# Patient Record
Sex: FEMALE | Race: WHITE | NOT HISPANIC OR LATINO
[De-identification: names, ages, dates, MRNs, and addresses within clinical notes are randomized per-mention and may not be internally consistent; named-entity substitution may affect disease eponyms.]

---

## 2017-01-06 ENCOUNTER — APPOINTMENT (OUTPATIENT)
Dept: HUMAN REPRODUCTION | Facility: CLINIC | Age: 32
End: 2017-01-06

## 2017-01-08 ENCOUNTER — APPOINTMENT (OUTPATIENT)
Dept: HUMAN REPRODUCTION | Facility: CLINIC | Age: 32
End: 2017-01-08

## 2017-01-09 ENCOUNTER — APPOINTMENT (OUTPATIENT)
Dept: HUMAN REPRODUCTION | Facility: CLINIC | Age: 32
End: 2017-01-09

## 2017-01-10 ENCOUNTER — APPOINTMENT (OUTPATIENT)
Dept: HUMAN REPRODUCTION | Facility: CLINIC | Age: 32
End: 2017-01-10

## 2017-02-02 ENCOUNTER — APPOINTMENT (OUTPATIENT)
Dept: HUMAN REPRODUCTION | Facility: CLINIC | Age: 32
End: 2017-02-02

## 2017-02-02 DIAGNOSIS — N97.9 FEMALE INFERTILITY, UNSPECIFIED: ICD-10-CM

## 2017-02-02 RX ORDER — CHORIOGONADOTROPIN ALFA 250 UG/.5ML
250 INJECTION, SOLUTION SUBCUTANEOUS
Qty: 1 | Refills: 2 | Status: ACTIVE | COMMUNITY
Start: 2017-02-02 | End: 1900-01-01

## 2017-02-04 ENCOUNTER — APPOINTMENT (OUTPATIENT)
Dept: HUMAN REPRODUCTION | Facility: CLINIC | Age: 32
End: 2017-02-04

## 2017-02-05 ENCOUNTER — APPOINTMENT (OUTPATIENT)
Dept: HUMAN REPRODUCTION | Facility: CLINIC | Age: 32
End: 2017-02-05

## 2017-03-01 ENCOUNTER — APPOINTMENT (OUTPATIENT)
Dept: HUMAN REPRODUCTION | Facility: CLINIC | Age: 32
End: 2017-03-01

## 2017-03-01 RX ORDER — CHORIOGONADOTROPIN ALFA 250 UG/.5ML
250 INJECTION, SOLUTION SUBCUTANEOUS
Qty: 0.5 | Refills: 0 | Status: ACTIVE | COMMUNITY
Start: 2017-03-01 | End: 1900-01-01

## 2017-03-03 ENCOUNTER — APPOINTMENT (OUTPATIENT)
Dept: HUMAN REPRODUCTION | Facility: CLINIC | Age: 32
End: 2017-03-03

## 2017-03-17 ENCOUNTER — APPOINTMENT (OUTPATIENT)
Dept: HUMAN REPRODUCTION | Facility: CLINIC | Age: 32
End: 2017-03-17

## 2017-03-17 RX ORDER — CHORIOGONADOTROPIN ALFA 250 UG/.5ML
250 INJECTION, SOLUTION SUBCUTANEOUS
Qty: 1 | Refills: 2 | Status: ACTIVE | COMMUNITY
Start: 2017-03-17 | End: 1900-01-01

## 2017-03-17 RX ORDER — CLOMIPHENE CITRATE 50 MG/1
50 TABLET ORAL DAILY
Qty: 5 | Refills: 0 | Status: ACTIVE | COMMUNITY
Start: 2017-03-17 | End: 1900-01-01

## 2017-03-24 ENCOUNTER — APPOINTMENT (OUTPATIENT)
Dept: HUMAN REPRODUCTION | Facility: CLINIC | Age: 32
End: 2017-03-24

## 2017-03-27 ENCOUNTER — APPOINTMENT (OUTPATIENT)
Dept: HUMAN REPRODUCTION | Facility: CLINIC | Age: 32
End: 2017-03-27

## 2017-03-31 ENCOUNTER — APPOINTMENT (OUTPATIENT)
Dept: HUMAN REPRODUCTION | Facility: CLINIC | Age: 32
End: 2017-03-31

## 2017-04-01 ENCOUNTER — APPOINTMENT (OUTPATIENT)
Dept: HUMAN REPRODUCTION | Facility: CLINIC | Age: 32
End: 2017-04-01

## 2017-04-14 ENCOUNTER — APPOINTMENT (OUTPATIENT)
Dept: HUMAN REPRODUCTION | Facility: CLINIC | Age: 32
End: 2017-04-14

## 2017-04-14 RX ORDER — CHORIOGONADOTROPIN ALFA 250 UG/.5ML
250 INJECTION, SOLUTION SUBCUTANEOUS
Qty: 1 | Refills: 0 | Status: ACTIVE | COMMUNITY
Start: 2017-04-14 | End: 1900-01-01

## 2017-04-14 RX ORDER — CLOMIPHENE CITRATE 50 MG/1
50 TABLET ORAL DAILY
Qty: 5 | Refills: 0 | Status: ACTIVE | COMMUNITY
Start: 2017-04-14 | End: 1900-01-01

## 2017-04-24 ENCOUNTER — APPOINTMENT (OUTPATIENT)
Dept: HUMAN REPRODUCTION | Facility: CLINIC | Age: 32
End: 2017-04-24

## 2017-04-27 ENCOUNTER — APPOINTMENT (OUTPATIENT)
Dept: HUMAN REPRODUCTION | Facility: CLINIC | Age: 32
End: 2017-04-27

## 2017-05-12 ENCOUNTER — APPOINTMENT (OUTPATIENT)
Dept: HUMAN REPRODUCTION | Facility: CLINIC | Age: 32
End: 2017-05-12

## 2017-05-23 ENCOUNTER — APPOINTMENT (OUTPATIENT)
Dept: HUMAN REPRODUCTION | Facility: CLINIC | Age: 32
End: 2017-05-23

## 2017-05-31 ENCOUNTER — APPOINTMENT (OUTPATIENT)
Dept: HUMAN REPRODUCTION | Facility: CLINIC | Age: 32
End: 2017-05-31

## 2017-06-12 ENCOUNTER — APPOINTMENT (OUTPATIENT)
Dept: HUMAN REPRODUCTION | Facility: CLINIC | Age: 32
End: 2017-06-12

## 2017-06-26 ENCOUNTER — APPOINTMENT (OUTPATIENT)
Dept: HUMAN REPRODUCTION | Facility: CLINIC | Age: 32
End: 2017-06-26

## 2017-08-23 ENCOUNTER — ASOB RESULT (OUTPATIENT)
Age: 32
End: 2017-08-23

## 2017-08-23 ENCOUNTER — APPOINTMENT (OUTPATIENT)
Dept: ANTEPARTUM | Facility: CLINIC | Age: 32
End: 2017-08-23
Payer: COMMERCIAL

## 2017-08-23 PROCEDURE — 76811 OB US DETAILED SNGL FETUS: CPT

## 2017-09-01 ENCOUNTER — ASOB RESULT (OUTPATIENT)
Age: 32
End: 2017-09-01

## 2017-09-01 ENCOUNTER — APPOINTMENT (OUTPATIENT)
Dept: ANTEPARTUM | Facility: CLINIC | Age: 32
End: 2017-09-01
Payer: COMMERCIAL

## 2017-09-01 PROCEDURE — 76816 OB US FOLLOW-UP PER FETUS: CPT

## 2017-09-25 ENCOUNTER — ASOB RESULT (OUTPATIENT)
Age: 32
End: 2017-09-25

## 2017-09-25 ENCOUNTER — APPOINTMENT (OUTPATIENT)
Dept: ANTEPARTUM | Facility: CLINIC | Age: 32
End: 2017-09-25
Payer: COMMERCIAL

## 2017-09-25 PROCEDURE — 76820 UMBILICAL ARTERY ECHO: CPT

## 2017-09-25 PROCEDURE — 76816 OB US FOLLOW-UP PER FETUS: CPT

## 2018-01-16 ENCOUNTER — INPATIENT (INPATIENT)
Facility: HOSPITAL | Age: 33
LOS: 1 days | Discharge: HOME | End: 2018-01-18
Attending: OBSTETRICS & GYNECOLOGY | Admitting: OBSTETRICS & GYNECOLOGY
Payer: COMMERCIAL

## 2018-01-16 VITALS
RESPIRATION RATE: 18 BRPM | HEART RATE: 86 BPM | TEMPERATURE: 98 F | SYSTOLIC BLOOD PRESSURE: 105 MMHG | OXYGEN SATURATION: 100 % | DIASTOLIC BLOOD PRESSURE: 65 MMHG

## 2018-01-16 DIAGNOSIS — Z3A.00 WEEKS OF GESTATION OF PREGNANCY NOT SPECIFIED: ICD-10-CM

## 2018-01-16 DIAGNOSIS — O26.899 OTHER SPECIFIED PREGNANCY RELATED CONDITIONS, UNSPECIFIED TRIMESTER: ICD-10-CM

## 2018-01-16 DIAGNOSIS — Z34.80 ENCOUNTER FOR SUPERVISION OF OTHER NORMAL PREGNANCY, UNSPECIFIED TRIMESTER: ICD-10-CM

## 2018-01-16 LAB
BASOPHILS # BLD AUTO: 0.1 K/UL — SIGNIFICANT CHANGE UP (ref 0–0.2)
BASOPHILS NFR BLD AUTO: 0.5 % — SIGNIFICANT CHANGE UP (ref 0–2)
BLD GP AB SCN SERPL QL: NEGATIVE — SIGNIFICANT CHANGE UP
EOSINOPHIL # BLD AUTO: 0.1 K/UL — SIGNIFICANT CHANGE UP (ref 0–0.5)
EOSINOPHIL NFR BLD AUTO: 0.6 % — SIGNIFICANT CHANGE UP (ref 0–6)
HCT VFR BLD CALC: 38 % — SIGNIFICANT CHANGE UP (ref 34.5–45)
HGB BLD-MCNC: 13.6 G/DL — SIGNIFICANT CHANGE UP (ref 11.5–15.5)
LYMPHOCYTES # BLD AUTO: 16.5 % — SIGNIFICANT CHANGE UP (ref 13–44)
LYMPHOCYTES # BLD AUTO: 2.1 K/UL — SIGNIFICANT CHANGE UP (ref 1–3.3)
MCHC RBC-ENTMCNC: 32.8 PG — SIGNIFICANT CHANGE UP (ref 27–34)
MCHC RBC-ENTMCNC: 35.8 GM/DL — SIGNIFICANT CHANGE UP (ref 32–36)
MCV RBC AUTO: 91.5 FL — SIGNIFICANT CHANGE UP (ref 80–100)
MONOCYTES # BLD AUTO: 1.1 K/UL — HIGH (ref 0–0.9)
MONOCYTES NFR BLD AUTO: 8.6 % — SIGNIFICANT CHANGE UP (ref 2–14)
NEUTROPHILS # BLD AUTO: 9.6 K/UL — HIGH (ref 1.8–7.4)
NEUTROPHILS NFR BLD AUTO: 73.8 % — SIGNIFICANT CHANGE UP (ref 43–77)
PLATELET # BLD AUTO: 145 K/UL — LOW (ref 150–400)
RBC # BLD: 4.15 M/UL — SIGNIFICANT CHANGE UP (ref 3.8–5.2)
RBC # FLD: 12.8 % — SIGNIFICANT CHANGE UP (ref 10.3–14.5)
RH IG SCN BLD-IMP: POSITIVE — SIGNIFICANT CHANGE UP
RH IG SCN BLD-IMP: POSITIVE — SIGNIFICANT CHANGE UP
T PALLIDUM AB TITR SER: NEGATIVE — SIGNIFICANT CHANGE UP
WBC # BLD: 13 K/UL — HIGH (ref 3.8–10.5)
WBC # FLD AUTO: 13 K/UL — HIGH (ref 3.8–10.5)

## 2018-01-16 RX ORDER — INFLUENZA VIRUS VACCINE 15; 15; 15; 15 UG/.5ML; UG/.5ML; UG/.5ML; UG/.5ML
0.5 SUSPENSION INTRAMUSCULAR ONCE
Qty: 0 | Refills: 0 | Status: DISCONTINUED | OUTPATIENT
Start: 2018-01-16 | End: 2018-01-16

## 2018-01-16 RX ORDER — OXYTOCIN 10 UNIT/ML
41.67 VIAL (ML) INJECTION
Qty: 20 | Refills: 0 | Status: DISCONTINUED | OUTPATIENT
Start: 2018-01-16 | End: 2018-01-18

## 2018-01-16 RX ORDER — SODIUM CHLORIDE 9 MG/ML
3 INJECTION INTRAMUSCULAR; INTRAVENOUS; SUBCUTANEOUS EVERY 8 HOURS
Qty: 0 | Refills: 0 | Status: DISCONTINUED | OUTPATIENT
Start: 2018-01-16 | End: 2018-01-16

## 2018-01-16 RX ORDER — AER TRAVELER 0.5 G/1
1 SOLUTION RECTAL; TOPICAL EVERY 4 HOURS
Qty: 0 | Refills: 0 | Status: DISCONTINUED | OUTPATIENT
Start: 2018-01-16 | End: 2018-01-18

## 2018-01-16 RX ORDER — OXYTOCIN 10 UNIT/ML
333.33 VIAL (ML) INJECTION
Qty: 20 | Refills: 0 | Status: DISCONTINUED | OUTPATIENT
Start: 2018-01-16 | End: 2018-01-18

## 2018-01-16 RX ORDER — SIMETHICONE 80 MG/1
80 TABLET, CHEWABLE ORAL EVERY 6 HOURS
Qty: 0 | Refills: 0 | Status: DISCONTINUED | OUTPATIENT
Start: 2018-01-16 | End: 2018-01-18

## 2018-01-16 RX ORDER — HYDROCORTISONE 1 %
1 OINTMENT (GRAM) TOPICAL EVERY 4 HOURS
Qty: 0 | Refills: 0 | Status: DISCONTINUED | OUTPATIENT
Start: 2018-01-16 | End: 2018-01-16

## 2018-01-16 RX ORDER — SODIUM CHLORIDE 9 MG/ML
1000 INJECTION, SOLUTION INTRAVENOUS ONCE
Qty: 0 | Refills: 0 | Status: COMPLETED | OUTPATIENT
Start: 2018-01-16 | End: 2018-01-16

## 2018-01-16 RX ORDER — ACETAMINOPHEN 500 MG
975 TABLET ORAL EVERY 6 HOURS
Qty: 0 | Refills: 0 | Status: COMPLETED | OUTPATIENT
Start: 2018-01-16 | End: 2018-12-15

## 2018-01-16 RX ORDER — DIBUCAINE 1 %
1 OINTMENT (GRAM) RECTAL EVERY 4 HOURS
Qty: 0 | Refills: 0 | Status: DISCONTINUED | OUTPATIENT
Start: 2018-01-16 | End: 2018-01-18

## 2018-01-16 RX ORDER — PRAMOXINE HYDROCHLORIDE 150 MG/15G
1 AEROSOL, FOAM RECTAL EVERY 4 HOURS
Qty: 0 | Refills: 0 | Status: DISCONTINUED | OUTPATIENT
Start: 2018-01-16 | End: 2018-01-16

## 2018-01-16 RX ORDER — OXYCODONE HYDROCHLORIDE 5 MG/1
5 TABLET ORAL
Qty: 0 | Refills: 0 | Status: DISCONTINUED | OUTPATIENT
Start: 2018-01-16 | End: 2018-01-18

## 2018-01-16 RX ORDER — TETANUS TOXOID, REDUCED DIPHTHERIA TOXOID AND ACELLULAR PERTUSSIS VACCINE, ADSORBED 5; 2.5; 8; 8; 2.5 [IU]/.5ML; [IU]/.5ML; UG/.5ML; UG/.5ML; UG/.5ML
0.5 SUSPENSION INTRAMUSCULAR ONCE
Qty: 0 | Refills: 0 | Status: DISCONTINUED | OUTPATIENT
Start: 2018-01-16 | End: 2018-01-18

## 2018-01-16 RX ORDER — MAGNESIUM HYDROXIDE 400 MG/1
30 TABLET, CHEWABLE ORAL
Qty: 0 | Refills: 0 | Status: DISCONTINUED | OUTPATIENT
Start: 2018-01-16 | End: 2018-01-18

## 2018-01-16 RX ORDER — OXYCODONE HYDROCHLORIDE 5 MG/1
5 TABLET ORAL EVERY 4 HOURS
Qty: 0 | Refills: 0 | Status: DISCONTINUED | OUTPATIENT
Start: 2018-01-16 | End: 2018-01-18

## 2018-01-16 RX ORDER — ACETAMINOPHEN 500 MG
975 TABLET ORAL EVERY 6 HOURS
Qty: 0 | Refills: 0 | Status: DISCONTINUED | OUTPATIENT
Start: 2018-01-16 | End: 2018-01-18

## 2018-01-16 RX ORDER — IBUPROFEN 200 MG
600 TABLET ORAL EVERY 6 HOURS
Qty: 0 | Refills: 0 | Status: COMPLETED | OUTPATIENT
Start: 2018-01-16 | End: 2018-12-15

## 2018-01-16 RX ORDER — PRAMOXINE HYDROCHLORIDE 150 MG/15G
1 AEROSOL, FOAM RECTAL EVERY 4 HOURS
Qty: 0 | Refills: 0 | Status: DISCONTINUED | OUTPATIENT
Start: 2018-01-16 | End: 2018-01-18

## 2018-01-16 RX ORDER — IBUPROFEN 200 MG
600 TABLET ORAL EVERY 6 HOURS
Qty: 0 | Refills: 0 | Status: DISCONTINUED | OUTPATIENT
Start: 2018-01-16 | End: 2018-01-18

## 2018-01-16 RX ORDER — AER TRAVELER 0.5 G/1
1 SOLUTION RECTAL; TOPICAL EVERY 4 HOURS
Qty: 0 | Refills: 0 | Status: DISCONTINUED | OUTPATIENT
Start: 2018-01-16 | End: 2018-01-16

## 2018-01-16 RX ORDER — LANOLIN
1 OINTMENT (GRAM) TOPICAL EVERY 6 HOURS
Qty: 0 | Refills: 0 | Status: DISCONTINUED | OUTPATIENT
Start: 2018-01-16 | End: 2018-01-18

## 2018-01-16 RX ORDER — SODIUM CHLORIDE 9 MG/ML
1000 INJECTION, SOLUTION INTRAVENOUS
Qty: 0 | Refills: 0 | Status: DISCONTINUED | OUTPATIENT
Start: 2018-01-16 | End: 2018-01-16

## 2018-01-16 RX ORDER — SODIUM CHLORIDE 9 MG/ML
3 INJECTION INTRAMUSCULAR; INTRAVENOUS; SUBCUTANEOUS EVERY 8 HOURS
Qty: 0 | Refills: 0 | Status: DISCONTINUED | OUTPATIENT
Start: 2018-01-16 | End: 2018-01-18

## 2018-01-16 RX ORDER — GLYCERIN ADULT
1 SUPPOSITORY, RECTAL RECTAL AT BEDTIME
Qty: 0 | Refills: 0 | Status: DISCONTINUED | OUTPATIENT
Start: 2018-01-16 | End: 2018-01-18

## 2018-01-16 RX ORDER — DIBUCAINE 1 %
1 OINTMENT (GRAM) RECTAL EVERY 4 HOURS
Qty: 0 | Refills: 0 | Status: DISCONTINUED | OUTPATIENT
Start: 2018-01-16 | End: 2018-01-16

## 2018-01-16 RX ORDER — CITRIC ACID/SODIUM CITRATE 300-500 MG
15 SOLUTION, ORAL ORAL EVERY 4 HOURS
Qty: 0 | Refills: 0 | Status: DISCONTINUED | OUTPATIENT
Start: 2018-01-16 | End: 2018-01-16

## 2018-01-16 RX ORDER — KETOROLAC TROMETHAMINE 30 MG/ML
30 SYRINGE (ML) INJECTION ONCE
Qty: 0 | Refills: 0 | Status: DISCONTINUED | OUTPATIENT
Start: 2018-01-16 | End: 2018-01-16

## 2018-01-16 RX ORDER — DOCUSATE SODIUM 100 MG
100 CAPSULE ORAL
Qty: 0 | Refills: 0 | Status: DISCONTINUED | OUTPATIENT
Start: 2018-01-16 | End: 2018-01-18

## 2018-01-16 RX ORDER — DIPHENHYDRAMINE HCL 50 MG
25 CAPSULE ORAL EVERY 6 HOURS
Qty: 0 | Refills: 0 | Status: DISCONTINUED | OUTPATIENT
Start: 2018-01-16 | End: 2018-01-18

## 2018-01-16 RX ORDER — HYDROCORTISONE 1 %
1 OINTMENT (GRAM) TOPICAL EVERY 4 HOURS
Qty: 0 | Refills: 0 | Status: DISCONTINUED | OUTPATIENT
Start: 2018-01-16 | End: 2018-01-18

## 2018-01-16 RX ORDER — OXYTOCIN 10 UNIT/ML
333.33 VIAL (ML) INJECTION
Qty: 20 | Refills: 0 | Status: COMPLETED | OUTPATIENT
Start: 2018-01-16

## 2018-01-16 RX ADMIN — SODIUM CHLORIDE 2000 MILLILITER(S): 9 INJECTION, SOLUTION INTRAVENOUS at 02:46

## 2018-01-16 RX ADMIN — Medication 600 MILLIGRAM(S): at 21:48

## 2018-01-16 RX ADMIN — Medication 125 MILLIUNIT(S)/MIN: at 12:05

## 2018-01-16 RX ADMIN — Medication 30 MILLIGRAM(S): at 13:24

## 2018-01-16 RX ADMIN — Medication 30 MILLIGRAM(S): at 12:54

## 2018-01-16 RX ADMIN — SODIUM CHLORIDE 125 MILLILITER(S): 9 INJECTION, SOLUTION INTRAVENOUS at 03:46

## 2018-01-16 RX ADMIN — Medication 600 MILLIGRAM(S): at 22:30

## 2018-01-17 LAB
HCT VFR BLD CALC: 31.8 % — LOW (ref 34.5–45)
HGB BLD-MCNC: 11.2 G/DL — LOW (ref 11.5–15.5)

## 2018-01-17 PROCEDURE — 85027 COMPLETE CBC AUTOMATED: CPT

## 2018-01-17 PROCEDURE — 86901 BLOOD TYPING SEROLOGIC RH(D): CPT

## 2018-01-17 PROCEDURE — 86900 BLOOD TYPING SEROLOGIC ABO: CPT

## 2018-01-17 PROCEDURE — 86850 RBC ANTIBODY SCREEN: CPT

## 2018-01-17 PROCEDURE — 59050 FETAL MONITOR W/REPORT: CPT

## 2018-01-17 PROCEDURE — G0463: CPT

## 2018-01-17 PROCEDURE — 59025 FETAL NON-STRESS TEST: CPT

## 2018-01-17 PROCEDURE — 85018 HEMOGLOBIN: CPT

## 2018-01-17 PROCEDURE — 86780 TREPONEMA PALLIDUM: CPT

## 2018-01-17 RX ADMIN — Medication 600 MILLIGRAM(S): at 05:30

## 2018-01-17 RX ADMIN — Medication 600 MILLIGRAM(S): at 04:30

## 2018-01-17 RX ADMIN — Medication 600 MILLIGRAM(S): at 23:50

## 2018-01-17 RX ADMIN — Medication 1 TABLET(S): at 11:26

## 2018-01-17 RX ADMIN — Medication 975 MILLIGRAM(S): at 08:35

## 2018-01-17 RX ADMIN — Medication 975 MILLIGRAM(S): at 07:51

## 2018-01-17 NOTE — PROGRESS NOTE ADULT - SUBJECTIVE AND OBJECTIVE BOX
OB Progress Note:  PPD#1    S: 31yo PPD#1 s/p . Patient feels well. Pain is well controlled. She is tolerating a regular diet and passing flatus. She is voiding spontaneously, and ambulating without difficulty. Denies CP/SOB. Denies lightheadedness/dizziness. Denies N/V.    O:  Vitals:  Vital Signs Last 24 Hrs  T(C): 36.4 (2018 21:16), Max: 36.6 (2018 11:50)  T(F): 97.5 (2018 21:16), Max: 97.9 (2018 11:50)  HR: 90 (2018 21:16) (71 - 97)  BP: 134/79 (2018 21:16) (97/60 - 134/79)  BP(mean): --  RR: 18 (2018 21:16) (17 - 18)  SpO2: 99% (2018 21:16) (97% - 100%)    MEDICATIONS  (STANDING):  acetaminophen   Tablet. 975 milliGRAM(s) Oral every 6 hours  diphtheria/tetanus/pertussis (acellular) Vaccine (ADAcel) 0.5 milliLiter(s) IntraMuscular once  ibuprofen  Tablet 600 milliGRAM(s) Oral every 6 hours  oxyCODONE    IR 5 milliGRAM(s) Oral every 3 hours  oxytocin Infusion 333.333 milliUNIT(s)/Min (1000 mL/Hr) IV Continuous <Continuous>  oxytocin Infusion 41.667 milliUNIT(s)/Min (125 mL/Hr) IV Continuous <Continuous>  oxytocin Infusion 41.667 milliUNIT(s)/Min (125 mL/Hr) IV Continuous <Continuous>  prenatal multivitamin 1 Tablet(s) Oral daily  sodium chloride 0.9% lock flush 3 milliLiter(s) IV Push every 8 hours      Labs:  Blood type: A Positive  Rubella IgG: RPR: Negative                          13.6   13.0<H> >-----------< 145<L>    (  @ 02:14 )             38.0                  Physical Exam:  General: NAD  Abdomen: soft, non-tender, non-distended, fundus firm  Vaginal: Lochia wnl  Extremities: NTBL

## 2018-01-17 NOTE — PROGRESS NOTE ADULT - PROBLEM SELECTOR PLAN 1
32y PPD#1 s/p  doing well.    Plan:  Analgesia prn  Regular diet  Follow up am labs  Routine post partum care
- Pain well controlled, continue current pain regimen  - Increase ambulation, SCDs when not ambulating  - Continue regular diet    Shannon Torrez D.O. PGY1

## 2018-01-17 NOTE — PROGRESS NOTE ADULT - SUBJECTIVE AND OBJECTIVE BOX
PPD#1- ATTENDING NOTE    S: Patient doing well. Minimal lochia. Pain controlled.    O: Vital Signs Last 24 Hrs  T(C): 36.8 (17 Jan 2018 10:00), Max: 36.8 (17 Jan 2018 10:00)  T(F): 98.3 (17 Jan 2018 10:00), Max: 98.3 (17 Jan 2018 10:00)  HR: 88 (17 Jan 2018 10:00) (71 - 97)  BP: 120/78 (17 Jan 2018 10:00) (97/60 - 134/79)  BP(mean): --  RR: 18 (17 Jan 2018 10:00) (17 - 18)  SpO2: 98% (17 Jan 2018 10:00) (97% - 100%)    Gen: NAD  Abd: soft, NT, ND, fundus firm below umbilicus -2  Lochia: moderate  Ext: no tenderness, no hyper reflexia,     Labs:                        11.2   x     )-----------( x        ( 17 Jan 2018 06:59 )             31.8       A:     Office [phone]  Dr. Flaherty

## 2018-01-18 ENCOUNTER — TRANSCRIPTION ENCOUNTER (OUTPATIENT)
Age: 33
End: 2018-01-18

## 2018-01-18 VITALS
TEMPERATURE: 98 F | SYSTOLIC BLOOD PRESSURE: 112 MMHG | HEART RATE: 84 BPM | OXYGEN SATURATION: 98 % | DIASTOLIC BLOOD PRESSURE: 80 MMHG | RESPIRATION RATE: 18 BRPM

## 2018-01-18 RX ORDER — IBUPROFEN 200 MG
1 TABLET ORAL
Qty: 0 | Refills: 0 | DISCHARGE
Start: 2018-01-18

## 2018-01-18 RX ORDER — ACETAMINOPHEN 500 MG
2 TABLET ORAL
Qty: 0 | Refills: 0 | DISCHARGE
Start: 2018-01-18

## 2018-01-18 RX ADMIN — Medication 600 MILLIGRAM(S): at 00:30

## 2018-01-18 NOTE — DISCHARGE NOTE OB - MEDICATION SUMMARY - MEDICATIONS TO TAKE
I will START or STAY ON the medications listed below when I get home from the hospital:    acetaminophen 325 mg oral tablet  -- 2 tab(s) by mouth every 6 hours  -- Indication: For  (normal spontaneous vaginal delivery)    ibuprofen 600 mg oral tablet  -- 1 tab(s) by mouth every 6 hours  -- Indication: For  (normal spontaneous vaginal delivery)    Prenatal Multivitamins with Folic Acid 1 mg oral tablet  -- 1 tab(s) by mouth once a day  -- Indication: For  (normal spontaneous vaginal delivery)

## 2018-01-18 NOTE — DISCHARGE NOTE OB - PATIENT PORTAL LINK FT
“You can access the FollowHealth Patient Portal, offered by [facility], by registering with the following website: [email or web address]”

## 2018-01-18 NOTE — DISCHARGE NOTE OB - MATERIALS PROVIDED
Breastfeeding Mother’s Support Group Information/Back To Sleep Handout/[facility] Hearing Screen Program/Breastfeeding Log/Bottle Feeding Log/Shaken Baby Prevention Handout/Breastfeeding Guide and Packet/Birth Certificate Instructions/[facility]  Screening Program/Guide to Postpartum Care

## 2018-01-18 NOTE — DISCHARGE NOTE OB - CARE PLAN
Principal Discharge DX:	 (normal spontaneous vaginal delivery)  Goal:	pain control, ambulation  Assessment and plan of treatment:	nothing per vagina

## 2018-01-18 NOTE — DISCHARGE NOTE OB - CARE PROVIDER_API CALL
Krista Gorman), Obstetrics and Gynecology  [address] [facility]  [address] [place] [address]  Phone: [phone]  Fax: [phone]

## 2019-06-11 ENCOUNTER — APPOINTMENT (OUTPATIENT)
Dept: HUMAN REPRODUCTION | Facility: CLINIC | Age: 34
End: 2019-06-11
Payer: COMMERCIAL

## 2019-06-11 PROCEDURE — 99214 OFFICE O/P EST MOD 30 MIN: CPT

## 2019-06-21 ENCOUNTER — APPOINTMENT (OUTPATIENT)
Dept: HUMAN REPRODUCTION | Facility: CLINIC | Age: 34
End: 2019-06-21

## 2019-06-26 ENCOUNTER — TRANSCRIPTION ENCOUNTER (OUTPATIENT)
Age: 34
End: 2019-06-26

## 2020-05-13 ENCOUNTER — RESULT REVIEW (OUTPATIENT)
Age: 35
End: 2020-05-13

## 2020-07-29 ENCOUNTER — ASOB RESULT (OUTPATIENT)
Age: 35
End: 2020-07-29

## 2020-07-29 ENCOUNTER — APPOINTMENT (OUTPATIENT)
Dept: ANTEPARTUM | Facility: CLINIC | Age: 35
End: 2020-07-29
Payer: COMMERCIAL

## 2020-07-29 PROCEDURE — 76811 OB US DETAILED SNGL FETUS: CPT

## 2020-08-12 ENCOUNTER — APPOINTMENT (OUTPATIENT)
Dept: ANTEPARTUM | Facility: CLINIC | Age: 35
End: 2020-08-12

## 2020-08-13 ENCOUNTER — APPOINTMENT (OUTPATIENT)
Dept: ANTEPARTUM | Facility: CLINIC | Age: 35
End: 2020-08-13
Payer: COMMERCIAL

## 2020-08-13 ENCOUNTER — ASOB RESULT (OUTPATIENT)
Age: 35
End: 2020-08-13

## 2020-08-13 PROCEDURE — 76816 OB US FOLLOW-UP PER FETUS: CPT

## 2020-11-24 ENCOUNTER — INPATIENT (INPATIENT)
Facility: HOSPITAL | Age: 35
LOS: 1 days | Discharge: HOME | End: 2020-11-26
Attending: OBSTETRICS & GYNECOLOGY | Admitting: OBSTETRICS & GYNECOLOGY
Payer: COMMERCIAL

## 2020-11-24 VITALS
DIASTOLIC BLOOD PRESSURE: 60 MMHG | RESPIRATION RATE: 18 BRPM | TEMPERATURE: 98 F | HEART RATE: 86 BPM | SYSTOLIC BLOOD PRESSURE: 109 MMHG

## 2020-11-24 DIAGNOSIS — O26.899 OTHER SPECIFIED PREGNANCY RELATED CONDITIONS, UNSPECIFIED TRIMESTER: ICD-10-CM

## 2020-11-24 DIAGNOSIS — Z34.80 ENCOUNTER FOR SUPERVISION OF OTHER NORMAL PREGNANCY, UNSPECIFIED TRIMESTER: ICD-10-CM

## 2020-11-24 DIAGNOSIS — K08.409 PARTIAL LOSS OF TEETH, UNSPECIFIED CAUSE, UNSPECIFIED CLASS: Chronic | ICD-10-CM

## 2020-11-24 DIAGNOSIS — Z3A.00 WEEKS OF GESTATION OF PREGNANCY NOT SPECIFIED: ICD-10-CM

## 2020-11-24 LAB
APPEARANCE UR: CLEAR — SIGNIFICANT CHANGE UP
BASOPHILS # BLD AUTO: 0.02 K/UL — SIGNIFICANT CHANGE UP (ref 0–0.2)
BASOPHILS NFR BLD AUTO: 0.3 % — SIGNIFICANT CHANGE UP (ref 0–2)
BILIRUB UR-MCNC: NEGATIVE — SIGNIFICANT CHANGE UP
COLOR SPEC: SIGNIFICANT CHANGE UP
DIFF PNL FLD: ABNORMAL
EOSINOPHIL # BLD AUTO: 0.05 K/UL — SIGNIFICANT CHANGE UP (ref 0–0.5)
EOSINOPHIL NFR BLD AUTO: 0.7 % — SIGNIFICANT CHANGE UP (ref 0–6)
GLUCOSE UR QL: NEGATIVE — SIGNIFICANT CHANGE UP
HCT VFR BLD CALC: 31.8 % — LOW (ref 34.5–45)
HGB BLD-MCNC: 10.7 G/DL — LOW (ref 11.5–15.5)
IMM GRANULOCYTES NFR BLD AUTO: 0.7 % — SIGNIFICANT CHANGE UP (ref 0–1.5)
KETONES UR-MCNC: NEGATIVE — SIGNIFICANT CHANGE UP
LEUKOCYTE ESTERASE UR-ACNC: NEGATIVE — SIGNIFICANT CHANGE UP
LYMPHOCYTES # BLD AUTO: 1.54 K/UL — SIGNIFICANT CHANGE UP (ref 1–3.3)
LYMPHOCYTES # BLD AUTO: 21.5 % — SIGNIFICANT CHANGE UP (ref 13–44)
MCHC RBC-ENTMCNC: 30.2 PG — SIGNIFICANT CHANGE UP (ref 27–34)
MCHC RBC-ENTMCNC: 33.6 GM/DL — SIGNIFICANT CHANGE UP (ref 32–36)
MCV RBC AUTO: 89.8 FL — SIGNIFICANT CHANGE UP (ref 80–100)
MONOCYTES # BLD AUTO: 0.57 K/UL — SIGNIFICANT CHANGE UP (ref 0–0.9)
MONOCYTES NFR BLD AUTO: 8 % — SIGNIFICANT CHANGE UP (ref 2–14)
NEUTROPHILS # BLD AUTO: 4.92 K/UL — SIGNIFICANT CHANGE UP (ref 1.8–7.4)
NEUTROPHILS NFR BLD AUTO: 68.8 % — SIGNIFICANT CHANGE UP (ref 43–77)
NITRITE UR-MCNC: NEGATIVE — SIGNIFICANT CHANGE UP
NRBC # BLD: 0 /100 WBCS — SIGNIFICANT CHANGE UP (ref 0–0)
PH UR: 6 — SIGNIFICANT CHANGE UP (ref 5–8)
PLATELET # BLD AUTO: 153 K/UL — SIGNIFICANT CHANGE UP (ref 150–400)
PROT UR-MCNC: NEGATIVE — SIGNIFICANT CHANGE UP
RBC # BLD: 3.54 M/UL — LOW (ref 3.8–5.2)
RBC # FLD: 14.2 % — SIGNIFICANT CHANGE UP (ref 10.3–14.5)
SARS-COV-2 IGG SERPL QL IA: NEGATIVE — SIGNIFICANT CHANGE UP
SARS-COV-2 IGM SERPL IA-ACNC: <0.1 INDEX — SIGNIFICANT CHANGE UP
SARS-COV-2 RNA SPEC QL NAA+PROBE: SIGNIFICANT CHANGE UP
SP GR SPEC: 1.01 — SIGNIFICANT CHANGE UP (ref 1.01–1.02)
T PALLIDUM AB TITR SER: NEGATIVE — SIGNIFICANT CHANGE UP
UROBILINOGEN FLD QL: NEGATIVE — SIGNIFICANT CHANGE UP
WBC # BLD: 7.15 K/UL — SIGNIFICANT CHANGE UP (ref 3.8–10.5)
WBC # FLD AUTO: 7.15 K/UL — SIGNIFICANT CHANGE UP (ref 3.8–10.5)

## 2020-11-24 RX ORDER — CITRIC ACID/SODIUM CITRATE 300-500 MG
15 SOLUTION, ORAL ORAL EVERY 6 HOURS
Refills: 0 | Status: DISCONTINUED | OUTPATIENT
Start: 2020-11-24 | End: 2020-11-25

## 2020-11-24 RX ORDER — ACETAMINOPHEN 500 MG
975 TABLET ORAL ONCE
Refills: 0 | Status: COMPLETED | OUTPATIENT
Start: 2020-11-24 | End: 2020-11-24

## 2020-11-24 RX ORDER — SODIUM CHLORIDE 9 MG/ML
1000 INJECTION, SOLUTION INTRAVENOUS
Refills: 0 | Status: DISCONTINUED | OUTPATIENT
Start: 2020-11-24 | End: 2020-11-25

## 2020-11-24 RX ORDER — OXYTOCIN 10 UNIT/ML
333.33 VIAL (ML) INJECTION
Qty: 20 | Refills: 0 | Status: DISCONTINUED | OUTPATIENT
Start: 2020-11-24 | End: 2020-11-26

## 2020-11-24 RX ADMIN — SODIUM CHLORIDE 125 MILLILITER(S): 9 INJECTION, SOLUTION INTRAVENOUS at 14:04

## 2020-11-24 RX ADMIN — SODIUM CHLORIDE 125 MILLILITER(S): 9 INJECTION, SOLUTION INTRAVENOUS at 11:35

## 2020-11-24 RX ADMIN — Medication 975 MILLIGRAM(S): at 19:25

## 2020-11-24 RX ADMIN — SODIUM CHLORIDE 125 MILLILITER(S): 9 INJECTION, SOLUTION INTRAVENOUS at 12:27

## 2020-11-24 NOTE — OB PROVIDER TRIAGE NOTE - NSHPPHYSICALEXAM_GEN_ALL_CORE
General: NAD, well appearing  Abdomen: Soft, gravid, NT, NR, NG  Extremities: No calf tenderness b/l    SSE: 5CC's of blood in vaginal vault, no active bleeding at level of cervix. No pooling, ferning negative. Nitrizine positive 2'2 to blood on swab.  SVE: 0.5/50/-3

## 2020-11-24 NOTE — OB PROVIDER TRIAGE NOTE - NSOBPROVIDERNOTE_OBGYN_ALL_OB_FT
36yo  @37w2d presenting c/o episode of vaginal bleeding. Patient reports that she woke up and didn't notice any discharge. But when she went to void at 745 am, she felt fluid come out and noticed blood in toilet. Patient unable to quantify the amount. After the one episode of bleeding, she hasn't noted any more bleeding. Has felt some mild "period like" cramping off and on. Endorses good fetal movement. Reports an otherwise uncomplicated pregnancy.    GBS unknown  EFW 3300    OBHx: FT , 18, 7lb5oz.  GYNHx: Denies  MHx: Denies  SHx: [place] teeth  PsychHx: Denies  SocialHx: Denies    Allergies: Denies  Meds: PNV    Vital Signs Last 24 Hrs  T(C): 36.9 (2020 08:58), Max: 36.9 (2020 08:58)  T(F): 98.4 (2020 08:58), Max: 98.4 (2020 08:58)  HR: 86 (2020 09:02) (86 - 86)  BP: 109/63 (2020 09:02) (109/60 - 109/63)  BP(mean): --  RR: 18 (2020 08:58) (18 - 18)  SpO2: --    General: NAD, well appearing  Abdomen: Soft, gravid, NT, NR, NG  Extremities: No calf tenderness b/l    SSE: 5CC's of blood in vaginal vault, no active bleeding at level of cervix. No pooling, ferning negative. Nitrizine positive 2'2 to blood on swab.   SVE: 0.5/50/-3    EFM: Baseline 140, moderate variability, +accels, no decels  [place]: No contractions    Assessment:  36yo  @37w2d w/ vaginal bleeding.     Plan:  -NST  -BPP  -Dispo pending    D/w Dr. LEROY Gorman (PMD)    Patsy Cox PA-C 36yo  @37w2d presenting c/o episode of vaginal bleeding. Patient reports that she woke up and didn't notice any discharge. But when she went to void at 745 am, she felt fluid come out and noticed blood in toilet. Patient unable to quantify the amount. After the one episode of bleeding, she hasn't noted any more bleeding. Has felt some mild "period like" cramping off and on. Endorses good fetal movement. Reports an otherwise uncomplicated pregnancy.    GBS neg  EFW 3300    OBHx: FT , 18, 7lb5oz.  GYNHx: Denies  MHx: Denies  SHx: [place] teeth  PsychHx: Denies  SocialHx: Denies    Allergies: Denies  Meds: PNV    Vital Signs Last 24 Hrs  T(C): 36.9 (2020 08:58), Max: 36.9 (2020 08:58)  T(F): 98.4 (2020 08:58), Max: 98.4 (2020 08:58)  HR: 86 (2020 09:02) (86 - 86)  BP: 109/63 (2020 09:02) (109/60 - 109/63)  BP(mean): --  RR: 18 (2020 08:58) (18 - 18)  SpO2: --    General: NAD, well appearing  Abdomen: Soft, gravid, NT, NR, NG  Extremities: No calf tenderness b/l    SSE: 5CC's of blood in vaginal vault, no active bleeding at level of cervix. No pooling, ferning negative. Nitrizine positive 2'2 to blood on swab.   SVE: 0.5/50/-3    EFM: Baseline 140, moderate variability, +accels, no decels  [place]: No contractions    Assessment:  36yo  @37w2d w/ likely PROM @745a.     Plan:  -Admit to L&D  -Admission labs  -IVF  -EFM/[place]  -Monitor vitals  -For PO cytotec  -Anticipate     D/w Dr. LEROY Gorman (PMD)    Patsy Cox PA-C

## 2020-11-24 NOTE — OB PROVIDER TRIAGE NOTE - HISTORY OF PRESENT ILLNESS
34yo  @37w2d presenting c/o episode of vaginal bleeding. Patient reports that she woke up and didn't notice any discharge. But when she went to void at 745 am, she felt fluid come out and noticed blood in toilet. Patient unable to quantify the amount. After the one episode of bleeding, she hasn't noted any more bleeding. Has felt some mild "period like" cramping off and on. Endorses good fetal movement. Reports an otherwise uncomplicated pregnancy.

## 2020-11-24 NOTE — OB PROVIDER H&P - ATTENDING COMMENTS
P1 at 37 2/7wks presented with bright red blood and leakage - some cramping +FM  SSE - scant brown discharge, no fluid noted   mod variability + q accels no decels no ctx  [place] 7lb  SVE adequate per PA FT    VANE 2.55cm - story likely c/w with PROM (VANE in office last visit was 12) - bpp 8/8, cephalic  will keep for augmentation  consents reviewed -all questions answered  fetal status reassuring

## 2020-11-24 NOTE — OB PROVIDER H&P - NSHPPHYSICALEXAM_GEN_ALL_CORE
General: NAD, well appearing  Heart: RRR  Lungs: CTA b/l  Abdomen: Soft, gravid, NT, NR, NG  Extremities: No calf tenderness b/l    SSE: 5CC's of blood in vaginal vault, no active bleeding at level of cervix. No pooling, ferning negative. Nitrizine positive 2'2 to blood on swab.   SVE: 0.5/50/-3

## 2020-11-24 NOTE — OB PROVIDER H&P - ASSESSMENT
34yo  @37w2d presenting c/o episode of vaginal bleeding. Patient reports that she woke up and didn't notice any discharge. But when she went to void at 745 am, she felt fluid come out and noticed blood in toilet. Patient unable to quantify the amount. After the one episode of bleeding, she hasn't noted any more bleeding. Has felt some mild "period like" cramping off and on. Endorses good fetal movement. Reports an otherwise uncomplicated pregnancy.    GBS neg  EFW 3300    OBHx: FT , 18, 7lb5oz.  GYNHx: Denies  MHx: Denies  SHx: [place] teeth  PsychHx: Denies  SocialHx: Denies    Allergies: Denies  Meds: PNV    Vital Signs Last 24 Hrs  T(C): 36.9 (2020 08:58), Max: 36.9 (2020 08:58)  T(F): 98.4 (2020 08:58), Max: 98.4 (2020 08:58)  HR: 86 (2020 09:02) (86 - 86)  BP: 109/63 (2020 09:02) (109/60 - 109/63)  BP(mean): --  RR: 18 (2020 08:58) (18 - 18)  SpO2: --    General: NAD, well appearing  Heart: RRR  Lungs: CTA B/l  Abdomen: Soft, gravid, NT, NR, NG  Extremities: No calf tenderness b/l    SSE: 5CC's of blood in vaginal vault, no active bleeding at level of cervix. No pooling, ferning negative. Nitrizine positive 2'2 to blood on swab.   SVE: 0.5/50/-3    EFM: Baseline 140, moderate variability, +accels, no decels  [place]: No contractions    Sono: Vertex, +FH, VANE 2.55 -> oligohydramnios    Assessment:  34yo  @37w2d w/ likely PROM @745a.     Plan:  -Admit to L&D  -Admission labs  -IVF  -EFM/[place]  -Monitor vitals  -For PO cytotec  -Anticipate     D/w Dr. LEROY Gorman (PMD)    Patsy Cox PA-C

## 2020-11-25 RX ORDER — TETANUS TOXOID, REDUCED DIPHTHERIA TOXOID AND ACELLULAR PERTUSSIS VACCINE, ADSORBED 5; 2.5; 8; 8; 2.5 [IU]/.5ML; [IU]/.5ML; UG/.5ML; UG/.5ML; UG/.5ML
0.5 SUSPENSION INTRAMUSCULAR ONCE
Refills: 0 | Status: DISCONTINUED | OUTPATIENT
Start: 2020-11-25 | End: 2020-11-26

## 2020-11-25 RX ORDER — OXYCODONE HYDROCHLORIDE 5 MG/1
5 TABLET ORAL
Refills: 0 | Status: DISCONTINUED | OUTPATIENT
Start: 2020-11-25 | End: 2020-11-26

## 2020-11-25 RX ORDER — KETOROLAC TROMETHAMINE 30 MG/ML
30 SYRINGE (ML) INJECTION ONCE
Refills: 0 | Status: DISCONTINUED | OUTPATIENT
Start: 2020-11-25 | End: 2020-11-25

## 2020-11-25 RX ORDER — IBUPROFEN 200 MG
600 TABLET ORAL EVERY 6 HOURS
Refills: 0 | Status: DISCONTINUED | OUTPATIENT
Start: 2020-11-25 | End: 2020-11-26

## 2020-11-25 RX ORDER — DIBUCAINE 1 %
1 OINTMENT (GRAM) RECTAL EVERY 6 HOURS
Refills: 0 | Status: DISCONTINUED | OUTPATIENT
Start: 2020-11-25 | End: 2020-11-26

## 2020-11-25 RX ORDER — AER TRAVELER 0.5 G/1
1 SOLUTION RECTAL; TOPICAL EVERY 4 HOURS
Refills: 0 | Status: DISCONTINUED | OUTPATIENT
Start: 2020-11-25 | End: 2020-11-26

## 2020-11-25 RX ORDER — MAGNESIUM HYDROXIDE 400 MG/1
30 TABLET, CHEWABLE ORAL
Refills: 0 | Status: DISCONTINUED | OUTPATIENT
Start: 2020-11-25 | End: 2020-11-26

## 2020-11-25 RX ORDER — SIMETHICONE 80 MG/1
80 TABLET, CHEWABLE ORAL EVERY 4 HOURS
Refills: 0 | Status: DISCONTINUED | OUTPATIENT
Start: 2020-11-25 | End: 2020-11-26

## 2020-11-25 RX ORDER — DIPHENHYDRAMINE HCL 50 MG
25 CAPSULE ORAL EVERY 6 HOURS
Refills: 0 | Status: DISCONTINUED | OUTPATIENT
Start: 2020-11-25 | End: 2020-11-26

## 2020-11-25 RX ORDER — LANOLIN
1 OINTMENT (GRAM) TOPICAL EVERY 6 HOURS
Refills: 0 | Status: DISCONTINUED | OUTPATIENT
Start: 2020-11-25 | End: 2020-11-26

## 2020-11-25 RX ORDER — OXYTOCIN 10 UNIT/ML
4 VIAL (ML) INJECTION
Qty: 30 | Refills: 0 | Status: DISCONTINUED | OUTPATIENT
Start: 2020-11-25 | End: 2020-11-26

## 2020-11-25 RX ORDER — ACETAMINOPHEN 500 MG
975 TABLET ORAL
Refills: 0 | Status: DISCONTINUED | OUTPATIENT
Start: 2020-11-25 | End: 2020-11-26

## 2020-11-25 RX ORDER — OXYTOCIN 10 UNIT/ML
333.33 VIAL (ML) INJECTION
Qty: 20 | Refills: 0 | Status: DISCONTINUED | OUTPATIENT
Start: 2020-11-25 | End: 2020-11-26

## 2020-11-25 RX ORDER — HYDROCORTISONE 1 %
1 OINTMENT (GRAM) TOPICAL EVERY 6 HOURS
Refills: 0 | Status: DISCONTINUED | OUTPATIENT
Start: 2020-11-25 | End: 2020-11-26

## 2020-11-25 RX ORDER — IBUPROFEN 200 MG
600 TABLET ORAL EVERY 6 HOURS
Refills: 0 | Status: COMPLETED | OUTPATIENT
Start: 2020-11-25 | End: 2021-10-24

## 2020-11-25 RX ORDER — SODIUM CHLORIDE 9 MG/ML
3 INJECTION INTRAMUSCULAR; INTRAVENOUS; SUBCUTANEOUS EVERY 8 HOURS
Refills: 0 | Status: DISCONTINUED | OUTPATIENT
Start: 2020-11-25 | End: 2020-11-26

## 2020-11-25 RX ORDER — BENZOCAINE 10 %
1 GEL (GRAM) MUCOUS MEMBRANE EVERY 6 HOURS
Refills: 0 | Status: DISCONTINUED | OUTPATIENT
Start: 2020-11-25 | End: 2020-11-26

## 2020-11-25 RX ORDER — PRAMOXINE HYDROCHLORIDE 150 MG/15G
1 AEROSOL, FOAM RECTAL EVERY 4 HOURS
Refills: 0 | Status: DISCONTINUED | OUTPATIENT
Start: 2020-11-25 | End: 2020-11-26

## 2020-11-25 RX ORDER — OXYCODONE HYDROCHLORIDE 5 MG/1
5 TABLET ORAL ONCE
Refills: 0 | Status: DISCONTINUED | OUTPATIENT
Start: 2020-11-25 | End: 2020-11-26

## 2020-11-25 RX ADMIN — Medication 975 MILLIGRAM(S): at 18:05

## 2020-11-25 RX ADMIN — Medication 975 MILLIGRAM(S): at 10:33

## 2020-11-25 RX ADMIN — Medication 975 MILLIGRAM(S): at 17:05

## 2020-11-25 RX ADMIN — SODIUM CHLORIDE 3 MILLILITER(S): 9 INJECTION INTRAMUSCULAR; INTRAVENOUS; SUBCUTANEOUS at 14:47

## 2020-11-25 RX ADMIN — Medication 600 MILLIGRAM(S): at 22:00

## 2020-11-25 RX ADMIN — Medication 1 TABLET(S): at 16:47

## 2020-11-25 RX ADMIN — Medication 4 MILLIUNIT(S)/MIN: at 04:29

## 2020-11-25 RX ADMIN — SODIUM CHLORIDE 125 MILLILITER(S): 9 INJECTION, SOLUTION INTRAVENOUS at 05:29

## 2020-11-25 RX ADMIN — Medication 30 MILLIGRAM(S): at 09:03

## 2020-11-25 RX ADMIN — Medication 600 MILLIGRAM(S): at 21:03

## 2020-11-25 RX ADMIN — Medication 600 MILLIGRAM(S): at 14:14

## 2020-11-25 RX ADMIN — Medication 600 MILLIGRAM(S): at 15:14

## 2020-11-25 NOTE — OB PROVIDER LABOR PROGRESS NOTE - ASSESSMENT
P1 admitted with PROM  reassuring fetal status cat 1  cont toco and efm  cont ivf  cont po cytotec for now  epidural prn  clears
a/p P1 with PROM - cont po cytotec augmentation  fetal status reassuring  cont toco and efm  cont po - SVE with more pain, change to pit as indicated  
PROM, fully dilated  -cont efm/toco  -expectant ,  d/w Dr. Morin who is en route.  BEVERLY Marte
a/p P1 for PROM IOL of labor - still in early labor  will cont po cytotec for another dose - cervix still with tone  then transition to pitocin  fetal status reassuring  cont toco and efm  cont epidural

## 2020-11-25 NOTE — OB RN DELIVERY SUMMARY - BABY A: APGAR 1 MIN RESP RATE, DELIVERY
Return to Work    10/14/2020      RE:    Elisha FUCHS Donnell   [id number]  [facility] [address]      This is to certify that Elisha was seen in the clinic today and may return to work following all restrictions per the [employer]ational Health Department.              Signature: __________________________________________________, 10/14/2020              Caro Flaherty [facility]  [address]  [place] [address] [phone]    
(2) good, crying

## 2020-11-25 NOTE — OB PROVIDER DELIVERY SUMMARY - NSPROVIDERDELIVERYNOTE_OBGYN_ALL_OB_FT
patient fully dilated. bed broken. head, shoulders and body delivered spontaneously. delayed cord clamping. pitocin started for active management of 3rd stage of labor. placenta delivered intact. bimanual exam done fundus firm and no retained placenta. laceration repaired as above. sulci and rectum intact with no suture in the rectum.

## 2020-11-25 NOTE — OB PROVIDER LABOR PROGRESS NOTE - NS_OBIHIFHRDETAILS_OBGYN_ALL_OB_FT
baseline 130, mod variability + q accels no decesl
mod variability + q accels no decels,
130, mod variability + q accels no decels
130/mod kendall/+accels/n odecemerle, discontinous

## 2020-11-25 NOTE — OB PROVIDER LABOR PROGRESS NOTE - NS_SUBJECTIVE/OBJECTIVE_OBGYN_ALL_OB_FT
FABY PAUL Note:    Pt seen and examined for progress. Pt reports increased vaginal pressure and discomfort. No urge to push yet.     O:  ICU Vital Signs Last 24 Hrs  T(C): 36.6 (25 Nov 2020 06:43), Max: 36.9 (24 Nov 2020 08:58)  T(F): 97.88 (25 Nov 2020 06:43), Max: 98.42 (24 Nov 2020 09:01)  HR: 84 (25 Nov 2020 07:39) (64 - 105)  BP: 125/73 (25 Nov 2020 07:24) (85/46 - 131/72)  RR: 18 (25 Nov 2020 06:43) (16 - 18)  SpO2: 100% (25 Nov 2020 07:39) (81% - 100%)  gen: NAD, tearful   abd: soft, gravid

## 2020-11-26 ENCOUNTER — TRANSCRIPTION ENCOUNTER (OUTPATIENT)
Age: 35
End: 2020-11-26

## 2020-11-26 VITALS
TEMPERATURE: 98 F | RESPIRATION RATE: 18 BRPM | SYSTOLIC BLOOD PRESSURE: 105 MMHG | DIASTOLIC BLOOD PRESSURE: 65 MMHG | HEART RATE: 71 BPM | OXYGEN SATURATION: 97 %

## 2020-11-26 PROCEDURE — G0463: CPT

## 2020-11-26 PROCEDURE — 86780 TREPONEMA PALLIDUM: CPT

## 2020-11-26 PROCEDURE — U0003: CPT

## 2020-11-26 PROCEDURE — 86850 RBC ANTIBODY SCREEN: CPT

## 2020-11-26 PROCEDURE — 86769 SARS-COV-2 COVID-19 ANTIBODY: CPT

## 2020-11-26 PROCEDURE — 81001 URINALYSIS AUTO W/SCOPE: CPT

## 2020-11-26 PROCEDURE — 86901 BLOOD TYPING SEROLOGIC RH(D): CPT

## 2020-11-26 PROCEDURE — 59050 FETAL MONITOR W/REPORT: CPT

## 2020-11-26 PROCEDURE — 85025 COMPLETE CBC W/AUTO DIFF WBC: CPT

## 2020-11-26 PROCEDURE — 86900 BLOOD TYPING SEROLOGIC ABO: CPT

## 2020-11-26 PROCEDURE — 59025 FETAL NON-STRESS TEST: CPT

## 2020-11-26 RX ORDER — IBUPROFEN 200 MG
1 TABLET ORAL
Qty: 0 | Refills: 0 | DISCHARGE
Start: 2020-11-26

## 2020-11-26 RX ORDER — ACETAMINOPHEN 500 MG
3 TABLET ORAL
Qty: 0 | Refills: 0 | DISCHARGE
Start: 2020-11-26

## 2020-11-26 RX ADMIN — Medication 975 MILLIGRAM(S): at 11:58

## 2020-11-26 RX ADMIN — Medication 600 MILLIGRAM(S): at 04:30

## 2020-11-26 RX ADMIN — Medication 600 MILLIGRAM(S): at 03:59

## 2020-11-26 RX ADMIN — Medication 1 TABLET(S): at 11:58

## 2020-11-26 NOTE — DISCHARGE NOTE OB - MEDICATION SUMMARY - MEDICATIONS TO TAKE
I will START or STAY ON the medications listed below when I get home from the hospital:    acetaminophen 325 mg oral tablet  -- 3 tab(s) by mouth   -- Indication: For  (normal spontaneous vaginal delivery)    ibuprofen 600 mg oral tablet  -- 1 tab(s) by mouth every 6 hours  -- Indication: For  (normal spontaneous vaginal delivery)    Prenatal Multivitamins with Folic Acid 1 mg oral tablet  -- 1 tab(s) by mouth once a day  -- Indication: For  (normal spontaneous vaginal delivery)

## 2020-11-26 NOTE — DISCHARGE NOTE OB - MATERIALS PROVIDED
Guide to Postpartum Care/[place]  Immunization Record/Bottle Feeding Log/Back To Sleep Handout/Breastfeeding Guide and Packet

## 2020-11-26 NOTE — PROGRESS NOTE ADULT - SUBJECTIVE AND OBJECTIVE BOX
BUSTER GARCIAS  MRN-[id number]    Subjective:  DOING WELL. FOR D/C TODAY  Vital Signs Last 24 Hrs  T(C): 36.6 (2020 05:51), Max: 36.7 (2020 12:05)  T(F): 97.9 (2020 05:51), Max: 98 (2020 12:05)  HR: 79 (2020 05:51) (63 - 94)  BP: 109/64 (2020 05:51) (99/55 - 118/69)  BP(mean): 73 (2020 10:40) (72 - 73)  RR: 17 (2020 05:51) (16 - 19)  SpO2: 97% (2020 05:51) (85% - 100%)    PHYSICAL EXAM:      Constitutional:W/D WF NAD      Abdomen: Firm fundus  Pelvic: Lochia mild                                        REVIEW OF SYSTEMS      General:	    Skin/Breast:  		  Gastrointestinal:	    Genitourinary:	      MEDICATIONS  (STANDING):  acetaminophen   Tablet .. 975 milliGRAM(s) Oral <User Schedule>  diphtheria/tetanus/pertussis (acellular) Vaccine (ADAcel) 0.5 milliLiter(s) IntraMuscular once  ibuprofen  Tablet. 600 milliGRAM(s) Oral every 6 hours  oxytocin Infusion 333.333 milliUNIT(s)/Min (1000 mL/Hr) IV Continuous <Continuous>  oxytocin Infusion 333.333 milliUNIT(s)/Min (1000 mL/Hr) IV Continuous <Continuous>  oxytocin Infusion 4 milliUNIT(s)/Min (4 mL/Hr) IV Continuous <Continuous>  prenatal multivitamin 1 Tablet(s) Oral daily  sodium chloride 0.9% lock flush 3 milliLiter(s) IV Push every 8 hours    MEDICATIONS  (PRN):  benzocaine 20%/menthol 0.5% Spray 1 Spray(s) Topical every 6 hours PRN for Perineal discomfort  dibucaine 1% Ointment 1 Application(s) Topical every 6 hours PRN Perineal discomfort  diphenhydrAMINE 25 milliGRAM(s) Oral every 6 hours PRN Pruritus  hydrocortisone 1% Cream 1 Application(s) Topical every 6 hours PRN Moderate Pain (4-6)  lanolin Ointment 1 Application(s) Topical every 6 hours PRN nipple soreness  magnesium hydroxide Suspension 30 milliLiter(s) Oral two times a day PRN Constipation  oxyCODONE    IR 5 milliGRAM(s) Oral every 3 hours PRN Moderate to Severe Pain (4-10)  oxyCODONE    IR 5 milliGRAM(s) Oral once PRN Moderate to Severe Pain (4-10)  pramoxine 1%/zinc 5% Cream 1 Application(s) Topical every 4 hours PRN Moderate Pain (4-6)  simethicone 80 milliGRAM(s) Chew every 4 hours PRN Gas  witch hazel Pads 1 Application(s) Topical every 4 hours PRN Perineal discomfort    Allergies    No Known Allergies    Intolerances        Impression:PPD #2  STABLE    Plan:D/C HOME. INSTR GIVEN. PPV 6 WEEKS.    ROGER BASS

## 2020-11-26 NOTE — DISCHARGE NOTE OB - CARE PROVIDER_API CALL
Jamal Morales  OBSTETRICS AND GYNECOLOGY  [address] [facility], [address]  [place] [address]  Phone: [phone]  Fax: [phone]  Follow Up Time:

## 2020-11-26 NOTE — DISCHARGE NOTE OB - PATIENT PORTAL LINK FT
You can access the FollowMyHealth Patient Portal offered by [facility] by registering at the following website: [email or web address]. By joining [employer]’s FollowMyHealth portal, you will also be able to view your health information using other applications (apps) compatible with our system.

## 2021-01-01 NOTE — OB RN DELIVERY SUMMARY - NS_SEPSISRSKCALC_OBGYN_ALL_OB_FT
(1) body pink, extremities blue
EOS calculated successfully. EOS Risk Factor: 0.5/1000 live births ([facility] national incidence); GA=37w3d; Temp=98.42; ROM=24.617; GBS='Negative'; Antibiotics='No antibiotics or any antibiotics < 2 hrs prior to birth'

## 2025-08-22 ENCOUNTER — LABORATORY RESULT (OUTPATIENT)
Age: 40
End: 2025-08-22

## 2025-08-22 ENCOUNTER — NON-APPOINTMENT (OUTPATIENT)
Age: 40
End: 2025-08-22

## 2025-08-22 ENCOUNTER — APPOINTMENT (OUTPATIENT)
Dept: INTERNAL MEDICINE | Facility: CLINIC | Age: 40
End: 2025-08-22
Payer: COMMERCIAL

## 2025-08-22 VITALS
HEIGHT: 61 IN | HEART RATE: 90 BPM | RESPIRATION RATE: 15 BRPM | DIASTOLIC BLOOD PRESSURE: 76 MMHG | OXYGEN SATURATION: 100 % | BODY MASS INDEX: 34.74 KG/M2 | SYSTOLIC BLOOD PRESSURE: 120 MMHG | TEMPERATURE: 98.4 F | WEIGHT: 184 LBS

## 2025-08-22 DIAGNOSIS — H91.93 UNSPECIFIED HEARING LOSS, BILATERAL: ICD-10-CM

## 2025-08-22 DIAGNOSIS — Z82.49 FAMILY HISTORY OF ISCHEMIC HEART DISEASE AND OTHER DISEASES OF THE CIRCULATORY SYSTEM: ICD-10-CM

## 2025-08-22 DIAGNOSIS — Z91.09 OTHER ALLERGY STATUS, OTHER THAN TO DRUGS AND BIOLOGICAL SUBSTANCES: ICD-10-CM

## 2025-08-22 DIAGNOSIS — Z00.00 ENCOUNTER FOR GENERAL ADULT MEDICAL EXAMINATION W/OUT ABNORMAL FINDINGS: ICD-10-CM

## 2025-08-22 DIAGNOSIS — E78.5 HYPERLIPIDEMIA, UNSPECIFIED: ICD-10-CM

## 2025-08-22 DIAGNOSIS — R61 GENERALIZED HYPERHIDROSIS: ICD-10-CM

## 2025-08-22 DIAGNOSIS — Z80.42 FAMILY HISTORY OF MALIGNANT NEOPLASM OF PROSTATE: ICD-10-CM

## 2025-08-22 DIAGNOSIS — N97.9 FEMALE INFERTILITY, UNSPECIFIED: ICD-10-CM

## 2025-08-22 LAB
25(OH)D3 SERPL-MCNC: 27.9 NG/ML
ALBUMIN SERPL ELPH-MCNC: 4.7 G/DL
ALP BLD-CCNC: 43 U/L
ALT SERPL-CCNC: 11 U/L
ANION GAP SERPL CALC-SCNC: 16 MMOL/L
APPEARANCE: CLEAR
AST SERPL-CCNC: 14 U/L
BASOPHILS # BLD AUTO: 0.05 K/UL
BASOPHILS NFR BLD AUTO: 0.8 %
BILIRUB SERPL-MCNC: 0.3 MG/DL
BILIRUBIN URINE: NEGATIVE
BLOOD URINE: NEGATIVE
BUN SERPL-MCNC: 11 MG/DL
CALCIUM SERPL-MCNC: 9.7 MG/DL
CHLORIDE SERPL-SCNC: 105 MMOL/L
CHOLEST SERPL-MCNC: 188 MG/DL
CO2 SERPL-SCNC: 19 MMOL/L
COLOR: YELLOW
CREAT SERPL-MCNC: 0.44 MG/DL
CRP SERPL HS-MCNC: 1.97 MG/L
EGFRCR SERPLBLD CKD-EPI 2021: 126 ML/MIN/1.73M2
EOSINOPHIL # BLD AUTO: 0.07 K/UL
EOSINOPHIL NFR BLD AUTO: 1.1 %
ERYTHROCYTE [SEDIMENTATION RATE] IN BLOOD BY WESTERGREN METHOD: 35 MM/HR
GLUCOSE QUALITATIVE U: NEGATIVE MG/DL
GLUCOSE SERPL-MCNC: 84 MG/DL
HCT VFR BLD CALC: 35.5 %
HDLC SERPL-MCNC: 76 MG/DL
HGB BLD-MCNC: 11.8 G/DL
IMM GRANULOCYTES NFR BLD AUTO: 0.2 %
KETONES URINE: NEGATIVE MG/DL
LDH SERPL-CCNC: 165 U/L
LDLC SERPL-MCNC: 103 MG/DL
LEUKOCYTE ESTERASE URINE: ABNORMAL
LYMPHOCYTES # BLD AUTO: 1.91 K/UL
LYMPHOCYTES NFR BLD AUTO: 31.2 %
MAN DIFF?: NORMAL
MCHC RBC-ENTMCNC: 28.7 PG
MCHC RBC-ENTMCNC: 33.2 G/DL
MCV RBC AUTO: 86.4 FL
MONOCYTES # BLD AUTO: 0.53 K/UL
MONOCYTES NFR BLD AUTO: 8.6 %
NEUTROPHILS # BLD AUTO: 3.56 K/UL
NEUTROPHILS NFR BLD AUTO: 58.1 %
NITRITE URINE: NEGATIVE
NONHDLC SERPL-MCNC: 112 MG/DL
PH URINE: 7
PHOSPHATE SERPL-MCNC: 3.4 MG/DL
PLATELET # BLD AUTO: 292 K/UL
POTASSIUM SERPL-SCNC: 4.3 MMOL/L
PROT SERPL-MCNC: 7.1 G/DL
PROTEIN URINE: NEGATIVE MG/DL
RBC # BLD: 4.11 M/UL
RBC # FLD: 13.7 %
SODIUM SERPL-SCNC: 140 MMOL/L
SPECIFIC GRAVITY URINE: 1.01
TRIGL SERPL-MCNC: 47 MG/DL
TSH SERPL-ACNC: 0.87 UIU/ML
URATE SERPL-MCNC: 3.9 MG/DL
UROBILINOGEN URINE: 0.2 MG/DL
WBC # FLD AUTO: 6.13 K/UL

## 2025-08-22 PROCEDURE — G0444 DEPRESSION SCREEN ANNUAL: CPT | Mod: 59

## 2025-08-22 PROCEDURE — 93000 ELECTROCARDIOGRAM COMPLETE: CPT | Mod: 59

## 2025-08-22 PROCEDURE — G0136: CPT | Mod: 33

## 2025-08-22 PROCEDURE — 36415 COLL VENOUS BLD VENIPUNCTURE: CPT

## 2025-08-22 PROCEDURE — 99385 PREV VISIT NEW AGE 18-39: CPT
